# Patient Record
Sex: FEMALE | Race: BLACK OR AFRICAN AMERICAN | Employment: UNEMPLOYED | URBAN - NONMETROPOLITAN AREA
[De-identification: names, ages, dates, MRNs, and addresses within clinical notes are randomized per-mention and may not be internally consistent; named-entity substitution may affect disease eponyms.]

---

## 2024-01-14 ENCOUNTER — APPOINTMENT (OUTPATIENT)
Dept: CT IMAGING | Age: 54
End: 2024-01-14

## 2024-01-14 ENCOUNTER — HOSPITAL ENCOUNTER (EMERGENCY)
Age: 54
Discharge: HOME | End: 2024-01-14
Attending: FAMILY MEDICINE

## 2024-01-14 VITALS
DIASTOLIC BLOOD PRESSURE: 97 MMHG | SYSTOLIC BLOOD PRESSURE: 160 MMHG | OXYGEN SATURATION: 93 % | HEART RATE: 102 BPM | RESPIRATION RATE: 18 BRPM | TEMPERATURE: 98.6 F

## 2024-01-14 DIAGNOSIS — N85.8 UTERINE MASS: ICD-10-CM

## 2024-01-14 DIAGNOSIS — R73.9 HYPERGLYCEMIA: ICD-10-CM

## 2024-01-14 DIAGNOSIS — R10.31 ABDOMINAL PAIN, RIGHT LOWER QUADRANT: Primary | ICD-10-CM

## 2024-01-14 LAB
ALBUMIN SERPL BCG-MCNC: 3.5 G/DL (ref 3.5–5.1)
ALP SERPL-CCNC: 126 U/L (ref 38–126)
ALT SERPL W/O P-5'-P-CCNC: 9 U/L (ref 11–66)
ANION GAP SERPL CALC-SCNC: 11 MEQ/L (ref 8–16)
AST SERPL-CCNC: 15 U/L (ref 5–40)
B-HCG SERPL QL: NEGATIVE
BASOPHILS ABSOLUTE: 0 THOU/MM3 (ref 0–0.1)
BASOPHILS NFR BLD AUTO: 0.4 %
BILIRUB SERPL-MCNC: 0.3 MG/DL (ref 0.3–1.2)
BUN SERPL-MCNC: 7 MG/DL (ref 7–22)
CALCIUM SERPL-MCNC: 8.9 MG/DL (ref 8.5–10.5)
CHLORIDE SERPL-SCNC: 93 MEQ/L (ref 98–111)
CO2 SERPL-SCNC: 27 MEQ/L (ref 23–33)
CREAT SERPL-MCNC: 0.8 MG/DL (ref 0.4–1.2)
DEPRECATED MEAN GLUCOSE BLD GHB EST-ACNC: 405 MG/DL (ref 70–126)
DEPRECATED RDW RBC AUTO: 35.5 FL (ref 35–45)
EOSINOPHIL NFR BLD AUTO: 0.3 %
EOSINOPHILS ABSOLUTE: 0 THOU/MM3 (ref 0–0.4)
ERYTHROCYTE [DISTWIDTH] IN BLOOD BY AUTOMATED COUNT: 11.8 % (ref 11.5–14.5)
GFR SERPL CREATININE-BSD FRML MDRD: > 60 ML/MIN/1.73M2
GLUCOSE SERPL-MCNC: 548 MG/DL (ref 70–108)
HBA1C MFR BLD HPLC: 15.5 % (ref 4.4–6.4)
HCT VFR BLD AUTO: 37.2 % (ref 37–47)
HGB BLD-MCNC: 12 GM/DL (ref 12–16)
IMM GRANULOCYTES # BLD AUTO: 0.03 THOU/MM3 (ref 0–0.07)
IMM GRANULOCYTES NFR BLD AUTO: 0.4 %
LACTIC ACID, SEPSIS: 1.2 MMOL/L (ref 0.5–1.9)
LIPASE SERPL-CCNC: 22.7 U/L (ref 5.6–51.3)
LYMPHOCYTES ABSOLUTE: 1.4 THOU/MM3 (ref 1–4.8)
LYMPHOCYTES NFR BLD AUTO: 19.9 %
MCH RBC QN AUTO: 26.6 PG (ref 26–33)
MCHC RBC AUTO-ENTMCNC: 32.3 GM/DL (ref 32.2–35.5)
MCV RBC AUTO: 82.5 FL (ref 81–99)
MONOCYTES ABSOLUTE: 0.5 THOU/MM3 (ref 0.4–1.3)
MONOCYTES NFR BLD AUTO: 6.9 %
NEUTROPHILS NFR BLD AUTO: 72.1 %
NRBC BLD AUTO-RTO: 0 /100 WBC
OSMOLALITY SERPL CALC.SUM OF ELEC: 285.6 MOSMOL/KG (ref 275–300)
PLATELET # BLD AUTO: 314 THOU/MM3 (ref 130–400)
PMV BLD AUTO: 11.4 FL (ref 9.4–12.4)
POTASSIUM SERPL-SCNC: 4 MEQ/L (ref 3.5–5.2)
PROT SERPL-MCNC: 7.7 G/DL (ref 6.1–8)
RBC # BLD AUTO: 4.51 MILL/MM3 (ref 4.2–5.4)
SEGMENTED NEUTROPHILS ABSOLUTE COUNT: 5.2 THOU/MM3 (ref 1.8–7.7)
SODIUM SERPL-SCNC: 131 MEQ/L (ref 135–145)
TROPONIN, HIGH SENSITIVITY: 10 NG/L (ref 0–12)
WBC # BLD AUTO: 7.2 THOU/MM3 (ref 4.8–10.8)

## 2024-01-14 PROCEDURE — 99285 EMERGENCY DEPT VISIT HI MDM: CPT

## 2024-01-14 PROCEDURE — 84703 CHORIONIC GONADOTROPIN ASSAY: CPT

## 2024-01-14 PROCEDURE — 96374 THER/PROPH/DIAG INJ IV PUSH: CPT

## 2024-01-14 PROCEDURE — 83036 HEMOGLOBIN GLYCOSYLATED A1C: CPT

## 2024-01-14 PROCEDURE — 85025 COMPLETE CBC W/AUTO DIFF WBC: CPT

## 2024-01-14 PROCEDURE — 36415 COLL VENOUS BLD VENIPUNCTURE: CPT

## 2024-01-14 PROCEDURE — 6370000000 HC RX 637 (ALT 250 FOR IP): Performed by: FAMILY MEDICINE

## 2024-01-14 PROCEDURE — 2580000003 HC RX 258: Performed by: FAMILY MEDICINE

## 2024-01-14 PROCEDURE — 93005 ELECTROCARDIOGRAM TRACING: CPT | Performed by: FAMILY MEDICINE

## 2024-01-14 PROCEDURE — 96361 HYDRATE IV INFUSION ADD-ON: CPT

## 2024-01-14 PROCEDURE — 96375 TX/PRO/DX INJ NEW DRUG ADDON: CPT

## 2024-01-14 PROCEDURE — 6360000004 HC RX CONTRAST MEDICATION: Performed by: FAMILY MEDICINE

## 2024-01-14 PROCEDURE — 80053 COMPREHEN METABOLIC PANEL: CPT

## 2024-01-14 PROCEDURE — 84484 ASSAY OF TROPONIN QUANT: CPT

## 2024-01-14 PROCEDURE — 83690 ASSAY OF LIPASE: CPT

## 2024-01-14 PROCEDURE — 6360000002 HC RX W HCPCS: Performed by: FAMILY MEDICINE

## 2024-01-14 PROCEDURE — 96372 THER/PROPH/DIAG INJ SC/IM: CPT

## 2024-01-14 PROCEDURE — 83605 ASSAY OF LACTIC ACID: CPT

## 2024-01-14 PROCEDURE — 74177 CT ABD & PELVIS W/CONTRAST: CPT

## 2024-01-14 RX ORDER — 0.9 % SODIUM CHLORIDE 0.9 %
1000 INTRAVENOUS SOLUTION INTRAVENOUS ONCE
Status: COMPLETED | OUTPATIENT
Start: 2024-01-14 | End: 2024-01-14

## 2024-01-14 RX ORDER — ONDANSETRON 2 MG/ML
4 INJECTION INTRAMUSCULAR; INTRAVENOUS ONCE
Status: COMPLETED | OUTPATIENT
Start: 2024-01-14 | End: 2024-01-14

## 2024-01-14 RX ORDER — OXYCODONE HYDROCHLORIDE AND ACETAMINOPHEN 5; 325 MG/1; MG/1
1 TABLET ORAL ONCE
Status: COMPLETED | OUTPATIENT
Start: 2024-01-14 | End: 2024-01-14

## 2024-01-14 RX ORDER — MORPHINE SULFATE 4 MG/ML
4 INJECTION, SOLUTION INTRAMUSCULAR; INTRAVENOUS ONCE
Status: COMPLETED | OUTPATIENT
Start: 2024-01-14 | End: 2024-01-14

## 2024-01-14 RX ORDER — HYDROCODONE BITARTRATE AND ACETAMINOPHEN 5; 325 MG/1; MG/1
1 TABLET ORAL EVERY 4 HOURS PRN
Qty: 18 TABLET | Refills: 0 | Status: SHIPPED | OUTPATIENT
Start: 2024-01-14 | End: 2024-01-17

## 2024-01-14 RX ADMIN — OXYCODONE HYDROCHLORIDE AND ACETAMINOPHEN 1 TABLET: 5; 325 TABLET ORAL at 19:49

## 2024-01-14 RX ADMIN — SODIUM CHLORIDE 1000 ML: 9 INJECTION, SOLUTION INTRAVENOUS at 17:50

## 2024-01-14 RX ADMIN — ONDANSETRON 4 MG: 2 INJECTION INTRAMUSCULAR; INTRAVENOUS at 17:51

## 2024-01-14 RX ADMIN — MORPHINE SULFATE 4 MG: 4 INJECTION, SOLUTION INTRAMUSCULAR; INTRAVENOUS at 17:51

## 2024-01-14 RX ADMIN — Medication 20 UNITS: at 18:52

## 2024-01-14 RX ADMIN — IOPAMIDOL 80 ML: 755 INJECTION, SOLUTION INTRAVENOUS at 18:40

## 2024-01-14 ASSESSMENT — PAIN SCALES - GENERAL
PAINLEVEL_OUTOF10: 5
PAINLEVEL_OUTOF10: 9
PAINLEVEL_OUTOF10: 5

## 2024-01-14 ASSESSMENT — PAIN DESCRIPTION - DESCRIPTORS: DESCRIPTORS: CRAMPING

## 2024-01-14 ASSESSMENT — PAIN - FUNCTIONAL ASSESSMENT
PAIN_FUNCTIONAL_ASSESSMENT: 0-10
PAIN_FUNCTIONAL_ASSESSMENT: 0-10

## 2024-01-14 ASSESSMENT — PAIN DESCRIPTION - LOCATION: LOCATION: ABDOMEN

## 2024-01-14 NOTE — ED TRIAGE NOTES
Patient presents to ED and triaged using the  device. Patient has c/o RLQ abdominal pain x1 day. States that the pain was originally on her L side but moved. Denies any other symptoms. Call ligtht in reach.

## 2024-01-14 NOTE — ED PROVIDER NOTES
EMERGENCY DEPARTMENT ENCOUNTER     CHIEF COMPLAINT   Chief Complaint   Patient presents with    Abdominal Pain        HPI   Marc Eda ILSaint Francios is a 53 y.o. female (language [place] Creole) who presents with RLQ abdominal pain, onset was 3 days ago. The duration has been intermittent then became constant since the onset today. Prior to that she's had intermittent LUQ pain that was baseline for her.  The patient has associated nausea but denies diarrhea, fever, or prior abdominal surgeries.  There are no alleviating factors.  The context is that the symptoms started spontaneously, without any known precipitants.    I used the  device to obtain the history of present illness from the patient as part of this ED visit. See session code in RN's note. Pt appeared uncomfortable, and through the  also mentioned that she's had some \"whitish\" discharge in her urine in past few days.    PAST MEDICAL & SURGICAL HISTORY   No past medical history on file.  No past surgical history on file.     CURRENT MEDICATIONS        ALLERGIES   No Known Allergies     SOCIAL AND FAMILY HISTORY   Social History     Socioeconomic History    Marital status: Single     No family history on file.     I reviewed all pertinent nursing notes and am in agreement.    I reviewed patient's social, medical and surgical histories.    PHYSICAL EXAM   VITAL SIGNS: BP (!) 160/97   Pulse (!) 102   Temp 98.6 °F (37 °C) (Oral)   Resp 18   SpO2 93%   Constitutional: Well developed, well nourished, moderate acute distress  Eyes: Sclera nonicteric, conjunctiva moist  Respiratory: No retractions, no accessory muscle use, normal respiratory effort  Cardiovascular: Tachycardic rate, normal rhythm, no murmurs  GI: Soft, moderate RLQ and suprapubic abdominal tenderness, no guarding, bowel sounds present, no audible bruits or palpable pulsatile masses  Musculoskeletal: No edema, no deformity, no CVA tenderness to palpation   Integument: No

## 2024-01-15 PROCEDURE — 93010 ELECTROCARDIOGRAM REPORT: CPT | Performed by: INTERNAL MEDICINE

## 2024-01-15 NOTE — ED NOTES
Patient resting in bed, respirations even and unlabored. Patient reports decreased pain at this time and is updated on plan of care.

## 2024-01-15 NOTE — DISCHARGE INSTRUCTIONS
OU BEZWEN CLAUDIA VILLAGOMEZ KLINIK MEDICIN FANMI LA JAIDEN 16 JANVYE SA A A 1:20 PM.    OU GEN DE PROBLEM. (1) MAS MATÈRIS MARQUITA LAZCANO DOAMADEO OU. PRAN NORCO CHAK 4-6 HÈ LEONILA SOUJOU DOAMADEO, MEN TOYA MENCHACA YON SUDHA LÈ W AP PRAN MIKHAIL MCLEAN.    OU GEN GWO SIK HERNANDO MORGAN THOMAS, DONK NOU REKÒMANDE LEONILA OU REPRENDE MIKHAIL PATEL YO, EVAN PASTOR. MANDE DOKTÈ A LEONILA EKRI OU YON PRESKRIPSYON LEONILA PRODUIT SA YO.

## 2024-01-16 ENCOUNTER — OFFICE VISIT (OUTPATIENT)
Dept: FAMILY MEDICINE CLINIC | Age: 54
End: 2024-01-16
Payer: COMMERCIAL

## 2024-01-16 VITALS
OXYGEN SATURATION: 99 % | RESPIRATION RATE: 16 BRPM | BODY MASS INDEX: 19.19 KG/M2 | SYSTOLIC BLOOD PRESSURE: 110 MMHG | TEMPERATURE: 98.1 F | DIASTOLIC BLOOD PRESSURE: 72 MMHG | HEIGHT: 68 IN | HEART RATE: 92 BPM | WEIGHT: 126.6 LBS

## 2024-01-16 DIAGNOSIS — R82.90 CLOUDY URINE: Primary | ICD-10-CM

## 2024-01-16 DIAGNOSIS — R93.5 ABNORMAL ABDOMINAL CT SCAN: ICD-10-CM

## 2024-01-16 DIAGNOSIS — E11.65 UNCONTROLLED TYPE 2 DIABETES MELLITUS WITH HYPERGLYCEMIA (HCC): ICD-10-CM

## 2024-01-16 DIAGNOSIS — N85.8 MASS OF UTERUS: ICD-10-CM

## 2024-01-16 DIAGNOSIS — R82.998 DARK URINE: ICD-10-CM

## 2024-01-16 DIAGNOSIS — N89.8 VAGINAL ODOR: ICD-10-CM

## 2024-01-16 LAB
BILIRUBIN, POC: NEGATIVE
BLOOD URINE, POC: NEGATIVE
CLARITY, POC: CLEAR
COLOR, POC: NORMAL
GLUCOSE URINE, POC: NORMAL
KETONES, POC: NORMAL
LEUKOCYTE EST, POC: NEGATIVE
NITRITE, POC: NEGATIVE
PH, POC: 5.5
PROTEIN, POC: NEGATIVE
SPECIFIC GRAVITY, POC: 1.01
UROBILINOGEN, POC: NORMAL

## 2024-01-16 PROCEDURE — 81003 URINALYSIS AUTO W/O SCOPE: CPT

## 2024-01-16 PROCEDURE — 99214 OFFICE O/P EST MOD 30 MIN: CPT

## 2024-01-16 PROCEDURE — 3046F HEMOGLOBIN A1C LEVEL >9.0%: CPT

## 2024-01-16 RX ORDER — INSULIN GLARGINE 100 [IU]/ML
10 INJECTION, SOLUTION SUBCUTANEOUS NIGHTLY
Qty: 5 ADJUSTABLE DOSE PRE-FILLED PEN SYRINGE | Refills: 0 | Status: SHIPPED | OUTPATIENT
Start: 2024-01-16 | End: 2024-01-18 | Stop reason: SDUPTHER

## 2024-01-16 SDOH — ECONOMIC STABILITY: INCOME INSECURITY: HOW HARD IS IT FOR YOU TO PAY FOR THE VERY BASICS LIKE FOOD, HOUSING, MEDICAL CARE, AND HEATING?: VERY HARD

## 2024-01-16 SDOH — ECONOMIC STABILITY: FOOD INSECURITY: WITHIN THE PAST 12 MONTHS, THE FOOD YOU BOUGHT JUST DIDN'T LAST AND YOU DIDN'T HAVE MONEY TO GET MORE.: OFTEN TRUE

## 2024-01-16 SDOH — ECONOMIC STABILITY: HOUSING INSECURITY
IN THE LAST 12 MONTHS, WAS THERE A TIME WHEN YOU DID NOT HAVE A STEADY PLACE TO SLEEP OR SLEPT IN A SHELTER (INCLUDING NOW)?: NO

## 2024-01-16 SDOH — ECONOMIC STABILITY: FOOD INSECURITY: WITHIN THE PAST 12 MONTHS, YOU WORRIED THAT YOUR FOOD WOULD RUN OUT BEFORE YOU GOT MONEY TO BUY MORE.: OFTEN TRUE

## 2024-01-16 ASSESSMENT — ENCOUNTER SYMPTOMS: ABDOMINAL PAIN: 1

## 2024-01-16 NOTE — PROGRESS NOTES
Patient:Marc Eda ILSaint Francios  YOB: 1970   MRN:[id number]    Subjective   53 y.o. female who presents for the following: Abdominal Pain (Started on the left, and has moved to the right and pain increased. Started a while ago, started having a foul odor and discharge a couple weeks ago,  white foam with urination /Just moved to area a while ago, having trouble finding work to buy food, has not went to job and family services )    Recent immigrant to [place] in 2017, recently coming to [place] in the past week.     Abdominal Pain  This is a chronic (Acute on chronic) problem. The current episode started more than 1 month ago (left side 5 months, right sided pain now). The onset quality is sudden. The problem occurs daily. The problem has been gradually worsening. The pain is located in the generalized abdominal region and suprapubic region. The pain is severe. The quality of the pain is aching.     Review of Systems   Gastrointestinal:  Positive for abdominal pain.     PMHx: She has a past medical history of Diabetes (HCC).  Social History     Socioeconomic History    Marital status: Single     Spouse name: Not on file    Number of children: Not on file    Years of education: Not on file    Highest education level: Not on file   Occupational History    Not on file   Tobacco Use    Smoking status: Never    Smokeless tobacco: Never   Substance and Sexual Activity    Alcohol use: Not on file    Drug use: Not on file    Sexual activity: Not on file   Other Topics Concern    Not on file   Social History Narrative    Not on file     Social Determinants of Health     Financial Resource Strain: High Risk (1/16/2024)    Overall Financial Resource Strain (CARDIA)     Difficulty of Paying Living Expenses: Very hard   Food Insecurity: Food Insecurity Present (1/16/2024)    Hunger Vital Sign     Worried About Running Out of Food in the Last Year: Often true     Ran Out of Food in the Last Year: Often true

## 2024-01-17 ENCOUNTER — CARE COORDINATION (OUTPATIENT)
Dept: CARE COORDINATION | Age: 54
End: 2024-01-17

## 2024-01-17 NOTE — CARE COORDINATION
SW received a referral from Coral Siddiqui regarding pt food insecurity. SW call [employer] and spoke with # [id number]. He called pt and she answered phone. Inquired about needs and pt stated food. Provided pt the number and address to [employer]. Pt stated they have no transportation. Inquired about how many in the home and pt stated 3. Pt verified address. Sw informed her that I will email a voucher with pt info included to [employer], Darlene Alonso .and request food box be delivered to [address] [place] [address].  Pt very appreciative.

## 2024-01-17 NOTE — CARE COORDINATION
BARBARA emailed Darlene at [facility] with a voucher attached requesting a food box be delivered to pt home address [address].

## 2024-01-18 ENCOUNTER — NURSE ONLY (OUTPATIENT)
Dept: FAMILY MEDICINE CLINIC | Age: 54
End: 2024-01-18

## 2024-01-18 DIAGNOSIS — E11.65 UNCONTROLLED TYPE 2 DIABETES MELLITUS WITH HYPERGLYCEMIA (HCC): ICD-10-CM

## 2024-01-18 LAB
EKG ATRIAL RATE: 108 BPM
EKG P AXIS: 78 DEGREES
EKG P-R INTERVAL: 184 MS
EKG Q-T INTERVAL: 326 MS
EKG QRS DURATION: 90 MS
EKG QTC CALCULATION (BAZETT): 436 MS
EKG R AXIS: -31 DEGREES
EKG T AXIS: 58 DEGREES
EKG VENTRICULAR RATE: 108 BPM

## 2024-01-18 RX ORDER — INSULIN GLARGINE 100 [IU]/ML
10 INJECTION, SOLUTION SUBCUTANEOUS NIGHTLY
Qty: 5 ADJUSTABLE DOSE PRE-FILLED PEN SYRINGE | Refills: 0 | Status: SHIPPED | OUTPATIENT
Start: 2024-01-18

## 2024-01-18 NOTE — PROGRESS NOTES
Pharmacy Medication Counseling Note      Counseled patient on new medications utilizing teach-back method. Reviewed indication, directions and most common side effects.  Insulin glargine, metformin    Spoke with patient via . Counseled on appropriate injection technique and medication storage. Handouts provided in English and Creole. States she has not yet picked up her insulin as the cost was $70. Medication available from [facility]. Pen needles available through [employer] Voucher. Form completed and given to patient with instructions to provide to pharmacy. Insulin glargine and metformin prescriptions sent to pharmacy per CPA.    She does endorse taking her metformin but does not believe it does anything for her sugars. Counseled on different mechanisms of action of insulin and metformin to lower blood sugars.     Also provided contact for patient at [facility] [employer] [facility] [employer] [phone]: Cyn Munguia.    Patient verbalized understanding.    Eun Brayn RP  2024 1:54 PM     For Pharmacy Admin Tracking Only    Program: Medical Group  CPA in place:  Yes  Recommendation Provided To: Patient/Caregiver: 2 via In person and Pharmacy: 1  Intervention Detail: Adherence Monitorin, Benefit Assistance, and Refill(s) Provided  Intervention Accepted By: Patient/Caregiver: 2 and Pharmacy: 1  Gap Closed?: No   Time Spent (min): 30

## 2024-01-18 NOTE — PROGRESS NOTES
[facility]   Prescription Program Authorization    Plan Name: [facility] ACTION VOUCHER Marc Eda ILSaint Francios  1970  [address] [place] [address]  [phone] (home)   Allergies:  Patient has no known allergies.     1/18/2024    Authorized Medications to fill (include quantities):     Pen Needles 32Gx4 MM #100      Authorization Signature:        Phone: [phone]          ([facility]  OR Pharmacist)       Donal Input Code: [facility] Group number: [id number]       *PHARMACY STAFF PLEASE NOTE:   This authorization is for the authorized prescription(s) listed above only   Quantity limitations - Per Rx   Drug coverage is per Rx form   Please file this authorization form with the prescription     *NO REFILLS ALLOWED

## 2024-02-09 ENCOUNTER — HOSPITAL ENCOUNTER (OUTPATIENT)
Dept: MRI IMAGING | Age: 54
Discharge: HOME | End: 2024-02-09
Payer: COMMERCIAL

## 2024-02-09 DIAGNOSIS — R93.5 ABNORMAL ABDOMINAL CT SCAN: ICD-10-CM

## 2024-02-09 DIAGNOSIS — N85.8 MASS OF UTERUS: ICD-10-CM

## 2024-02-09 DIAGNOSIS — R82.90 CLOUDY URINE: ICD-10-CM

## 2024-02-09 DIAGNOSIS — N89.8 VAGINAL ODOR: ICD-10-CM

## 2024-02-09 DIAGNOSIS — R82.998 DARK URINE: ICD-10-CM

## 2024-02-09 PROCEDURE — 6360000004 HC RX CONTRAST MEDICATION

## 2024-02-09 PROCEDURE — A9579 GAD-BASE MR CONTRAST NOS,1ML: HCPCS

## 2024-02-09 PROCEDURE — 72197 MRI PELVIS W/O & W/DYE: CPT

## 2024-02-09 RX ADMIN — GADOTERIDOL 10 ML: 279.3 INJECTION, SOLUTION INTRAVENOUS at 17:58

## 2024-02-16 ENCOUNTER — OFFICE VISIT (OUTPATIENT)
Dept: FAMILY MEDICINE CLINIC | Age: 54
End: 2024-02-16
Payer: COMMERCIAL

## 2024-02-16 VITALS
WEIGHT: 128.6 LBS | TEMPERATURE: 97.8 F | BODY MASS INDEX: 19.49 KG/M2 | HEIGHT: 68 IN | DIASTOLIC BLOOD PRESSURE: 86 MMHG | SYSTOLIC BLOOD PRESSURE: 130 MMHG | OXYGEN SATURATION: 100 % | RESPIRATION RATE: 16 BRPM | HEART RATE: 92 BPM

## 2024-02-16 DIAGNOSIS — Z11.4 SCREENING FOR HIV (HUMAN IMMUNODEFICIENCY VIRUS): ICD-10-CM

## 2024-02-16 DIAGNOSIS — E11.65 UNCONTROLLED TYPE 2 DIABETES MELLITUS WITH HYPERGLYCEMIA (HCC): Primary | ICD-10-CM

## 2024-02-16 DIAGNOSIS — E11.42 DIABETIC POLYNEUROPATHY ASSOCIATED WITH TYPE 2 DIABETES MELLITUS (HCC): ICD-10-CM

## 2024-02-16 DIAGNOSIS — R14.3 FLATULENCE: ICD-10-CM

## 2024-02-16 DIAGNOSIS — Z11.59 NEED FOR HEPATITIS C SCREENING TEST: ICD-10-CM

## 2024-02-16 DIAGNOSIS — R14.0 BLOATING: ICD-10-CM

## 2024-02-16 PROCEDURE — G8420 CALC BMI NORM PARAMETERS: HCPCS

## 2024-02-16 PROCEDURE — G8427 DOCREV CUR MEDS BY ELIG CLIN: HCPCS

## 2024-02-16 PROCEDURE — 2022F DILAT RTA XM EVC RTNOPTHY: CPT

## 2024-02-16 PROCEDURE — G8484 FLU IMMUNIZE NO ADMIN: HCPCS

## 2024-02-16 PROCEDURE — 99214 OFFICE O/P EST MOD 30 MIN: CPT

## 2024-02-16 PROCEDURE — 1036F TOBACCO NON-USER: CPT

## 2024-02-16 PROCEDURE — 3017F COLORECTAL CA SCREEN DOC REV: CPT

## 2024-02-16 PROCEDURE — 3046F HEMOGLOBIN A1C LEVEL >9.0%: CPT

## 2024-02-16 RX ORDER — GABAPENTIN 100 MG/1
100 CAPSULE ORAL NIGHTLY
Qty: 90 CAPSULE | Refills: 0 | Status: SHIPPED | OUTPATIENT
Start: 2024-02-16 | End: 2024-05-16

## 2024-02-16 RX ORDER — METFORMIN HYDROCHLORIDE 500 MG/1
2000 TABLET, EXTENDED RELEASE ORAL
Qty: 120 TABLET | Refills: 2 | Status: SHIPPED | OUTPATIENT
Start: 2024-02-16 | End: 2024-05-16

## 2024-02-16 NOTE — PROGRESS NOTES
[facility]ESSION[facility] MEDICINE PRACTICE  [address]  [facility] [address]  Dept: [phone]  Loc: [phone]      Marc Eda ILSaint Francois (:  1970) is a 53 y.o. female,{New vs Established:[id number]::\"Established patient\"}, here for evaluation of the following chief complaint(s):  No chief complaint on file.      ASSESSMENT/PLAN:  1. Uncontrolled type 2 diabetes mellitus with hyperglycemia (HCC)    A1c 15.5 on 2024  Metformin 1 g daily  Insulin glargine long-acting 10 units nightly    MRI with and without contrast***      No follow-ups on file.    SUBJECTIVE/OBJECTIVE:  HPI      Review of Systems  Constitutional: Negative for chills, diaphoresis and fever.   HENT: Negative for congestion and sore throat.    Eyes: Negative for redness and visual disturbance.   Respiratory: Negative for cough, chest tightness and shortness of breath.    Cardiovascular: Negative for palpitations and leg swelling.   Gastrointestinal: Negative for abdominal distention, abdominal pain and nausea.   Genitourinary: Negative for difficulty urinating and dysuria.   Musculoskeletal: Negative for back pain and neck pain.   Skin: Negative for color change and pallor.   Neurological: Negative for dizziness and light-headedness.   Psychiatric/Behavioral: Negative for agitation and confusion. The patient is not nervous/anxious    Physical Exam  General appearance: No apparent distress, appears stated age and cooperative.  HEENT: Pupils equal, round, and reactive to light. Conjunctivae/corneas clear.  Neck: Supple, with full range of motion. No jugular venous distention. Trachea midline.  Respiratory:  Normal respiratory effort. Clear to auscultation, bilaterally without Rales/Wheezes/Rhonchi.  Cardiovascular: Regular rate and rhythm with normal S1/S2 without murmurs, rubs or gallops.  Abdomen: Soft, non-tender, non-distended with normal bowel sounds.  Musculoskeletal: passive 
Attending Physician Note    I saw and evaluated the patient, performing the key elements of the service.  I discussed the findings, assessment and plan with the resident and agree with the resident's findings and plan as documented in the resident's note.  GC modifier added.    Brief summary:  DM type 2, uncontrolled - increase metformin (XR) to 2000mg daily.      Peripheral neuropathy, foot pain - low dose gabapentin.    Needs pelvic exam - if she does not hear back from gyn, will have pelvic/Pap at next visit here.  Will need crc screening, mammogram also.  
areas - > 1 area with absence of sensation is + for neuropathy)    Plus at least one of the following:  Pulses: normal,     Most Recent Data:  Lab Results   Component Value Date    WBC 7.2 01/14/2024    HGB 12.0 01/14/2024    HCT 37.2 01/14/2024     01/14/2024    ALT 9 (L) 01/14/2024    AST 15 01/14/2024     (L) 01/14/2024    CL 93 (L) 01/14/2024    K 4.0 01/14/2024    CREATININE 0.8 01/14/2024    BUN 7 01/14/2024    CO2 27 01/14/2024    LABA1C 15.5 (H) 01/14/2024     Current Outpatient Medications   Medication Instructions    Basaglar KwikPen 10 Units, SubCUTAneous, NIGHTLY    Blood Glucose Monitoring Suppl (CONTOUR NEXT MONITOR) w/Device KIT USE TO TEST BLOOD SUGAR DAILY    CONTOUR NEXT TEST strip TEST BLOOD GLUCOSE DAILY    gabapentin (NEURONTIN) 100 mg, Oral, Nightly    Insulin Pen Needle 32G X 4 MM MISC 1 each, Does not apply, DAILY    metFORMIN (GLUCOPHAGE-XR) 2,000 mg, Oral, DAILY WITH BREAKFAST    Microlet Lancets MISC USE TO TEST BLOOD SUGAR THREE TIMES DAILY    Simethicone 80 mg, Oral, 2 TIMES DAILY     Assessment & Plan:      1. Uncontrolled type 2 diabetes mellitus with hyperglycemia (HCC)  -     metFORMIN (GLUCOPHAGE-XR) 500 MG extended release tablet; Take 4 tablets by mouth daily (with breakfast), Disp-120 tablet, R-2Normal  -     Lipid, Fasting; Future  -     Microalbumin / Creatinine Urine Ratio; Future  2. Diabetic polyneuropathy associated with type 2 diabetes mellitus (HCC)  -     metFORMIN (GLUCOPHAGE-XR) 500 MG extended release tablet; Take 4 tablets by mouth daily (with breakfast), Disp-120 tablet, R-2Normal  -     gabapentin (NEURONTIN) 100 MG capsule; Take 1 capsule by mouth at bedtime for 90 days., Disp-90 capsule, R-0Normal  3. Flatulence  -     Simethicone 80 MG TABS; Take 80 mg by mouth 2 times daily, Disp-120 tablet, R-0Normal  4. Bloating  -     Simethicone 80 MG TABS; Take 80 mg by mouth 2 times daily, Disp-120 tablet, R-0Normal  5. Need for hepatitis C screening test  -